# Patient Record
Sex: FEMALE | Race: WHITE | Employment: FULL TIME
[De-identification: names, ages, dates, MRNs, and addresses within clinical notes are randomized per-mention and may not be internally consistent; named-entity substitution may affect disease eponyms.]

---

## 2020-05-03 ENCOUNTER — NURSE TRIAGE (OUTPATIENT)
Dept: OTHER | Facility: CLINIC | Age: 35
End: 2020-05-03

## 2021-10-05 ENCOUNTER — OFFICE VISIT (OUTPATIENT)
Dept: OBGYN CLINIC | Age: 36
End: 2021-10-05
Payer: COMMERCIAL

## 2021-10-05 VITALS
HEIGHT: 70 IN | BODY MASS INDEX: 39.51 KG/M2 | WEIGHT: 276 LBS | DIASTOLIC BLOOD PRESSURE: 80 MMHG | SYSTOLIC BLOOD PRESSURE: 130 MMHG

## 2021-10-05 DIAGNOSIS — F41.9 ANXIETY: ICD-10-CM

## 2021-10-05 DIAGNOSIS — Z01.419 WOMEN'S ANNUAL ROUTINE GYNECOLOGICAL EXAMINATION: Primary | ICD-10-CM

## 2021-10-05 DIAGNOSIS — N94.6 DYSMENORRHEA: ICD-10-CM

## 2021-10-05 PROCEDURE — 99395 PREV VISIT EST AGE 18-39: CPT | Performed by: NURSE PRACTITIONER

## 2021-10-05 PROCEDURE — G8484 FLU IMMUNIZE NO ADMIN: HCPCS | Performed by: NURSE PRACTITIONER

## 2021-10-05 RX ORDER — NORETHINDRONE 0.35 MG/1
1 TABLET ORAL DAILY
Qty: 90 TABLET | Refills: 3 | Status: SHIPPED | OUTPATIENT
Start: 2021-10-05 | End: 2022-09-06

## 2021-10-05 RX ORDER — SERTRALINE HYDROCHLORIDE 100 MG/1
100 TABLET, FILM COATED ORAL DAILY
COMMUNITY
End: 2021-10-05 | Stop reason: SDUPTHER

## 2021-10-05 RX ORDER — SERTRALINE HYDROCHLORIDE 100 MG/1
100 TABLET, FILM COATED ORAL DAILY
Qty: 90 TABLET | Refills: 3 | Status: SHIPPED | OUTPATIENT
Start: 2021-10-05 | End: 2022-09-12

## 2021-10-05 RX ORDER — NORETHINDRONE 0.35 MG/1
TABLET ORAL
COMMUNITY
Start: 2021-07-26 | End: 2021-10-05 | Stop reason: SDUPTHER

## 2021-10-05 NOTE — PROGRESS NOTES
 CHOLECYSTECTOMY         Family History   Problem Relation Age of Onset    Pancreatic Cancer Maternal Grandmother     Diabetes Maternal Grandmother     Hypertension Father     High Cholesterol Father     Breast Cancer Maternal Aunt 61       Chief Complaint   Patient presents with    Gynecologic Exam     Last pap 08/26/19. Feeling well, voices no concerns. PE:  Vital Signs  Blood pressure 130/80, height 5' 10\" (1.778 m), weight 276 lb (125.2 kg), last menstrual period 09/29/2021. Estimated body mass index is 39.6 kg/m² as calculated from the following:    Height as of this encounter: 5' 10\" (1.778 m). Weight as of this encounter: 276 lb (125.2 kg). HPI: Patient presents today for annual exam. Feeling well, voices no concerns. Denies breast/pelvic pain. Negative pap 8/2019. Reports monthly menses with POP. Anxiety well controlled with Zoloft. Desires refill of medication. Review of Systems   All other systems reviewed and are negative. Objective  Heent:   negative   Cor: regular rate and rhythm, no murmurs              Pul:clear to auscultation bilaterally- no wheezes, rales or rhonchi, normal air movement, no respiratory distress      GI: Abdomen soft, non-tender. BS normal. No masses,  No organomegaly           Extremities: normal strength, tone, and muscle mass   Breasts: Breast:normal appearance, no masses or tenderness, No nipple retraction or dimpling, No nipple discharge or bleeding   Pelvic Exam: GENITAL/URINARY:  External Genitalia:  General appearance; normal, Hair distribution; normal, Lesions absent  Urethral Meatus:  Size normal, Location normal, Lesions absent, Prolapse absent  Urethra:   Fullness absent, Masses absent  Bladder:  Fullness absent, Masses absent, Tenderness absent, Cystocele absent  Vagina:  General appearance normal, Estrogen effect normal, Discharge absent, Lesions absent, Pelvic support normal  Cervix:  General appearance normal, Lesions absent, Discharge absent, Tenderness absent, Enlargement absent, Nodularity absent  Uterus:  Size normal, Tenderness absent  [place]a: Masses absent, Tenderness absent, difficult to assess due to body habitus   Anus/Perineum:  Lesions absent and Masses absent                                    Vaginal discharge: no vaginal discharge  Chaperone: not present                          Assessment and Plan          Diagnosis Orders   1. Women's annual routine gynecological examination     2. Dysmenorrhea  BIBI 0.35 MG tablet   3. Anxiety  sertraline (ZOLOFT) 100 MG tablet             I have changed Daphnie Gillette's Bibi and sertraline. Return in about 1 year (around 10/5/2022) for yearly. She was also counseled on her preventative health maintenance recommendations and follow-up. There are no Patient Instructions on file for this visit.     KAT Woods NP,10/5/2021 5:06 PM

## 2022-09-06 DIAGNOSIS — N94.6 DYSMENORRHEA: ICD-10-CM

## 2022-09-06 RX ORDER — NORETHINDRONE 0.35 MG/1
TABLET ORAL
Qty: 84 TABLET | Refills: 3 | Status: SHIPPED | OUTPATIENT
Start: 2022-09-06 | End: 2022-10-19 | Stop reason: SDUPTHER

## 2022-09-12 DIAGNOSIS — F41.9 ANXIETY: ICD-10-CM

## 2022-09-12 RX ORDER — SERTRALINE HYDROCHLORIDE 100 MG/1
TABLET, FILM COATED ORAL
Qty: 90 TABLET | Refills: 0 | Status: SHIPPED | OUTPATIENT
Start: 2022-09-12

## 2022-10-19 ENCOUNTER — HOSPITAL ENCOUNTER (OUTPATIENT)
Age: 37
Setting detail: SPECIMEN
Discharge: HOME | End: 2022-10-19

## 2022-10-19 ENCOUNTER — OFFICE VISIT (OUTPATIENT)
Dept: OBGYN CLINIC | Age: 37
End: 2022-10-19
Payer: COMMERCIAL

## 2022-10-19 VITALS
WEIGHT: 273 LBS | SYSTOLIC BLOOD PRESSURE: 126 MMHG | DIASTOLIC BLOOD PRESSURE: 86 MMHG | BODY MASS INDEX: 39.08 KG/M2 | HEIGHT: 70 IN

## 2022-10-19 DIAGNOSIS — Z01.419 VISIT FOR GYNECOLOGIC EXAMINATION: Primary | ICD-10-CM

## 2022-10-19 DIAGNOSIS — N94.6 DYSMENORRHEA: ICD-10-CM

## 2022-10-19 PROCEDURE — 99395 PREV VISIT EST AGE 18-39: CPT

## 2022-10-19 PROCEDURE — G8484 FLU IMMUNIZE NO ADMIN: HCPCS

## 2022-10-19 RX ORDER — NORETHINDRONE 0.35 MG/1
TABLET ORAL
Qty: 84 TABLET | Refills: 3 | Status: SHIPPED | OUTPATIENT
Start: 2022-10-19

## 2022-10-19 NOTE — PROGRESS NOTES
YEARLY PHYSICAL    Date of service: 10/19/2022    Karin Bhandari  Is a 44 y.o.  single female    PT's PCP is: No primary care provider on file. : 1985                                         Chaperone for Intimate Exam  Chaperone was offered as part of the rooming process. Patient declined and agrees to continue with exam without a chaperone. Subjective:       Patient's last menstrual period was 10/06/2022 (exact date). Are your menses regular: yes    OB History    Para Term  AB Living   1 1 1     1   SAB IAB Ectopic Molar Multiple Live Births             1      # Outcome Date GA Lbr Lázaro/2nd Weight Sex Delivery Anes PTL Lv   1 Term 20 37w0d  5 lb 12 oz (2.608 kg) F CS-LTranv   JEFF      Complications: Failure to Progress in Second Stage, Pre-eclampsia        Social History     Tobacco Use   Smoking Status Former    Types: Cigarettes   Smokeless Tobacco Never        Social History     Substance and Sexual Activity   Alcohol Use Yes       Family History   Problem Relation Age of Onset    Pancreatic Cancer Maternal Grandmother     Diabetes Maternal Grandmother     Hypertension Father     High Cholesterol Father     Breast Cancer Maternal Aunt 61       Any family history of breast or ovarian cancer:  Yes    Any family history of blood clots: No      Allergies: Shellfish allergy      Current Outpatient Medications:     CB 0.35 MG tablet, TAKE 1 TABLET DAILY, Disp: 84 tablet, Rfl: 3    sertraline (ZOLOFT) 100 MG tablet, TAKE 1 TABLET DAILY, Disp: 90 tablet, Rfl: 0    Social History     Substance and Sexual Activity   Sexual Activity Yes    Partners: Male    Birth control/protection: OCP       Any bleeding or pain with intercourse: No    Last Yearly:  10/05/21    Last pap: 19    Last HPV: 19    Last Mammogram: never    Last Dexascan never    Last colorectal screen- type:never    Do you do self breast exams: Yes    Past Medical History:   Diagnosis Date    Allergic rhinitis     Anxiety     Seasonal allergies        Past Surgical History:   Procedure Laterality Date     SECTION      CHOLECYSTECTOMY      TONSILLECTOMY         Family History   Problem Relation Age of Onset    Pancreatic Cancer Maternal Grandmother     Diabetes Maternal Grandmother     Hypertension Father     High Cholesterol Father     Breast Cancer Maternal Aunt 61       Chief Complaint   Patient presents with    Annual Exam     Here for annual exam. Last annual 10/05/21. Last pap 19 NL/Neg. No issues or concerns today. Needs refill on OCP. PE:  Vital Signs  Blood pressure 126/86, height 5' 10\" (1.778 m), weight 273 lb (123.8 kg), last menstrual period 10/06/2022. Estimated body mass index is 39.17 kg/m² as calculated from the following:    Height as of this encounter: 5' 10\" (1.778 m). Weight as of this encounter: 273 lb (123.8 kg). Labs:    No results found for this visit on 10/19/22. No data recorded    NURSE: HERSON ralph RN    HPI: Patient presents for annual visit. Denies breast concerns. Encouraged SBE. Denies bowel/bladder concerns. Denies abnormal discharge, pain with intercourse. Due for Pap smear today. Cycles well controlled on POP. Desires 1 year refills. Review of Systems   Constitutional:  Negative for chills, fatigue and fever. Respiratory:  Negative for shortness of breath. Cardiovascular:  Negative for chest pain. Gastrointestinal:  Negative for abdominal pain, constipation and diarrhea. Genitourinary:  Negative for dyspareunia, dysuria, frequency, menstrual problem, pelvic pain, urgency, vaginal bleeding and vaginal discharge. Neurological:  Negative for dizziness, light-headedness and headaches. Physical Exam  Constitutional:       Appearance: Normal appearance. Genitourinary:      Vulva normal.      Right Labia: No rash, tenderness or lesions.      Left Labia: No tenderness, lesions or rash. No vaginal discharge, tenderness or bleeding. Right Adnexa: not tender and not full. Left Adnexa: not tender and not full. No cervical motion tenderness or discharge. Uterus is not enlarged or tender. Pelvic exam was performed with patient in the lithotomy position. Breasts:     Breasts are symmetrical.      Right: No inverted nipple, nipple discharge, skin change or tenderness. Left: No inverted nipple, nipple discharge, skin change or tenderness. HENT:      Head: Normocephalic and atraumatic. Mouth/Throat:      Mouth: Mucous membranes are moist.   Eyes:      Extraocular Movements: Extraocular movements intact. Cardiovascular:      Rate and Rhythm: Normal rate. Pulmonary:      Effort: Pulmonary effort is normal.   Abdominal:      General: There is no distension. Palpations: Abdomen is soft. Tenderness: There is no abdominal tenderness. Musculoskeletal:         General: Normal range of motion. Cervical back: Normal range of motion. Neurological:      General: No focal deficit present. Mental Status: She is alert and oriented to person, place, and time. Skin:     General: Skin is warm and dry. Psychiatric:         Mood and Affect: Mood normal.         Behavior: Behavior normal.         Thought Content: Thought content normal.         Judgment: Judgment normal.   Chaperone present: chaperone declined. Assessment and Plan          Diagnosis Orders   1. Visit for gynecologic examination  PAP SMEAR      2. Dysmenorrhea  BIBI 0.35 MG tablet          Repeat Annual every 1 year  Cervical Cytology Evaluation begins at 24years old. If Negative Cytology, Follow-up screening per current guidelines. Mammograms every 1year. If 35 yo and last mammogram was negative. Routine healthmaintenance per patients PCP. I am having Monique Oropeza maintain her sertraline and Bibi.     Return in about 1 year (around 10/19/2023) for annual.    She was also counseled on her preventative health maintenance recommendations and follow-up. There are no Patient Instructions on file for this visit.     Yaneth Victor PA-C,10/28/2022 8:24 AM

## 2022-10-21 LAB
HPV SAMPLE: NORMAL
HPV, GENOTYPE 16: NOT DETECTED
HPV, GENOTYPE 18: NOT DETECTED
HPV, HIGH RISK OTHER: NOT DETECTED
HPV, INTERPRETATION: NORMAL
SPECIMEN DESCRIPTION: NORMAL

## 2022-10-24 LAB — CYTOLOGY REPORT: NORMAL

## 2022-10-28 ASSESSMENT — ENCOUNTER SYMPTOMS
SHORTNESS OF BREATH: 0
CONSTIPATION: 0
ABDOMINAL PAIN: 0
DIARRHEA: 0

## 2022-12-12 DIAGNOSIS — F41.9 ANXIETY: ICD-10-CM

## 2022-12-12 RX ORDER — SERTRALINE HYDROCHLORIDE 100 MG/1
TABLET, FILM COATED ORAL
Qty: 90 TABLET | Refills: 3 | Status: SHIPPED | OUTPATIENT
Start: 2022-12-12

## 2023-09-26 DIAGNOSIS — N94.6 DYSMENORRHEA: ICD-10-CM

## 2023-09-26 RX ORDER — NORETHINDRONE 0.35 MG/1
TABLET ORAL
Qty: 84 TABLET | Refills: 0 | Status: SHIPPED | OUTPATIENT
Start: 2023-09-26

## 2023-10-30 ENCOUNTER — OFFICE VISIT (OUTPATIENT)
Dept: OBGYN CLINIC | Age: 38
End: 2023-10-30
Payer: COMMERCIAL

## 2023-10-30 VITALS — SYSTOLIC BLOOD PRESSURE: 122 MMHG | DIASTOLIC BLOOD PRESSURE: 82 MMHG | BODY MASS INDEX: 38.05 KG/M2 | WEIGHT: 265.2 LBS

## 2023-10-30 DIAGNOSIS — Z01.419 WOMEN'S ANNUAL ROUTINE GYNECOLOGICAL EXAMINATION: Primary | ICD-10-CM

## 2023-10-30 DIAGNOSIS — N94.6 DYSMENORRHEA: ICD-10-CM

## 2023-10-30 PROCEDURE — 99395 PREV VISIT EST AGE 18-39: CPT | Performed by: NURSE PRACTITIONER

## 2023-10-30 RX ORDER — NORETHINDRONE 0.35 MG/1
1 TABLET ORAL DAILY
Qty: 84 TABLET | Refills: 3 | Status: SHIPPED | OUTPATIENT
Start: 2023-10-30

## 2023-10-30 RX ORDER — PHENTERMINE HYDROCHLORIDE 37.5 MG/1
37.5 CAPSULE ORAL DAILY
COMMUNITY
Start: 2023-10-06

## 2023-10-31 ASSESSMENT — ENCOUNTER SYMPTOMS
CONSTIPATION: 0
SHORTNESS OF BREATH: 0
DIARRHEA: 0
ABDOMINAL PAIN: 0

## 2024-01-02 DIAGNOSIS — N94.6 DYSMENORRHEA: ICD-10-CM

## 2024-01-02 RX ORDER — NORETHINDRONE 0.35 MG/1
1 TABLET ORAL DAILY
Qty: 84 TABLET | Refills: 3 | OUTPATIENT
Start: 2024-01-02

## 2024-01-08 DIAGNOSIS — F41.9 ANXIETY: ICD-10-CM

## 2024-01-08 RX ORDER — SERTRALINE HYDROCHLORIDE 100 MG/1
TABLET, FILM COATED ORAL
Qty: 90 TABLET | Refills: 2 | Status: SHIPPED | OUTPATIENT
Start: 2024-01-08

## 2024-09-16 DIAGNOSIS — F41.9 ANXIETY: ICD-10-CM

## 2024-09-16 RX ORDER — SERTRALINE HYDROCHLORIDE 100 MG/1
TABLET, FILM COATED ORAL
Qty: 90 TABLET | Refills: 0 | Status: SHIPPED | OUTPATIENT
Start: 2024-09-16

## 2024-10-28 DIAGNOSIS — N94.6 DYSMENORRHEA: ICD-10-CM

## 2024-10-28 RX ORDER — NORETHINDRONE 0.35 MG/1
1 TABLET ORAL DAILY
Qty: 84 TABLET | Refills: 0 | Status: SHIPPED | OUTPATIENT
Start: 2024-10-28

## 2024-11-05 ENCOUNTER — OFFICE VISIT (OUTPATIENT)
Dept: OBGYN CLINIC | Age: 39
End: 2024-11-05
Payer: COMMERCIAL

## 2024-11-05 VITALS
HEIGHT: 70 IN | WEIGHT: 282 LBS | SYSTOLIC BLOOD PRESSURE: 128 MMHG | BODY MASS INDEX: 40.37 KG/M2 | DIASTOLIC BLOOD PRESSURE: 78 MMHG

## 2024-11-05 DIAGNOSIS — N94.6 DYSMENORRHEA: ICD-10-CM

## 2024-11-05 DIAGNOSIS — Z01.419 WOMEN'S ANNUAL ROUTINE GYNECOLOGICAL EXAMINATION: Primary | ICD-10-CM

## 2024-11-05 PROCEDURE — 99396 PREV VISIT EST AGE 40-64: CPT | Performed by: NURSE PRACTITIONER

## 2024-11-05 RX ORDER — NORETHINDRONE 0.35 MG/1
1 TABLET ORAL DAILY
Qty: 84 TABLET | Refills: 4 | Status: SHIPPED | OUTPATIENT
Start: 2024-11-05

## 2024-11-05 ASSESSMENT — ENCOUNTER SYMPTOMS
CONSTIPATION: 0
DIARRHEA: 0
SHORTNESS OF BREATH: 0
ABDOMINAL PAIN: 0

## 2024-11-05 NOTE — PROGRESS NOTES
YEARLY PHYSICAL    Date of service: 2024    Daphnie Gillette  Is a 39 y.o. female    PT's PCP is: No primary care provider on file.     : 1985                                         Chaperone for Intimate Exam  Chaperone was offered as part of the rooming process. Patient declined and agrees to continue with exam without a chaperone.  Chaperone: n/a      Subjective:       Patient's last menstrual period was 10/23/2024.     Are your menses regular: yes    OB History    Para Term  AB Living   1 1 1     1   SAB IAB Ectopic Molar Multiple Live Births             1      # Outcome Date GA Lbr Lázaro/2nd Weight Sex Type Anes PTL Lv   1 Term 20 37w0d  2.608 kg (5 lb 12 oz) F CS-LTranv   JEFF      Complications: Failure to Progress in Second Stage, Pre-eclampsia        Social History     Tobacco Use   Smoking Status Former    Types: Cigarettes   Smokeless Tobacco Never        Social History     Substance and Sexual Activity   Alcohol Use Yes       Family History   Problem Relation Age of Onset    Pancreatic Cancer Maternal Grandmother     Diabetes Maternal Grandmother     Hypertension Father     High Cholesterol Father     Breast Cancer Maternal Aunt 60       Any family history of breast or ovarian cancer: Yes    Any family history of blood clots: No      Allergies: Shellfish allergy      Current Outpatient Medications:     CB 0.35 MG tablet, Take 1 tablet by mouth daily, Disp: 84 tablet, Rfl: 4    sertraline (ZOLOFT) 100 MG tablet, TAKE 1 TABLET DAILY, Disp: 90 tablet, Rfl: 0    Social History     Substance and Sexual Activity   Sexual Activity Yes    Partners: Male    Birth control/protection: OCP       Any bleeding or pain with intercourse: No    Last Yearly:  10/30/23    Last pap: 10/19/22-neg    Last HPV: 10/19/22-neg    Last Mammogram: n/a    Last Dexascan n/a    Last colorectal screen- n/a    Do you do self breast

## 2024-12-16 DIAGNOSIS — F41.9 ANXIETY: ICD-10-CM

## 2024-12-16 RX ORDER — SERTRALINE HYDROCHLORIDE 100 MG/1
TABLET, FILM COATED ORAL
Qty: 90 TABLET | Refills: 3 | Status: SHIPPED | OUTPATIENT
Start: 2024-12-16